# Patient Record
Sex: MALE | Race: WHITE | NOT HISPANIC OR LATINO | Employment: STUDENT | URBAN - METROPOLITAN AREA
[De-identification: names, ages, dates, MRNs, and addresses within clinical notes are randomized per-mention and may not be internally consistent; named-entity substitution may affect disease eponyms.]

---
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
## 2019-01-02 PROBLEM — B27.09 EBV HEPATITIS: Status: ACTIVE | Noted: 2019-01-02

## 2019-01-02 PROBLEM — E86.1 INTRAVASCULAR VOLUME DEPLETION: Status: ACTIVE | Noted: 2019-01-02

## 2019-01-02 PROBLEM — B17.8 EBV HEPATITIS: Status: ACTIVE | Noted: 2019-01-02

## 2019-01-04 PROBLEM — R16.1 SPLENOMEGALY: Status: ACTIVE | Noted: 2019-01-04

## 2019-01-04 PROBLEM — J03.90 EXUDATIVE TONSILLITIS: Status: ACTIVE | Noted: 2019-01-04

## 2019-01-04 PROBLEM — B27.99 INFECTIOUS MONONUCLEOSIS WITH HEPATITIS: Status: ACTIVE | Noted: 2019-01-04

## 2019-01-04 PROBLEM — B17.8 INFECTIOUS MONONUCLEOSIS WITH HEPATITIS: Status: ACTIVE | Noted: 2019-01-04

## 2019-01-04 PROBLEM — R10.9 ABDOMINAL PAIN: Status: ACTIVE | Noted: 2019-01-04

## 2019-01-11 ENCOUNTER — INFUSION (OUTPATIENT)
Dept: INFECTIOUS DISEASES | Facility: HOSPITAL | Age: 19
End: 2019-01-11
Attending: INTERNAL MEDICINE
Payer: COMMERCIAL

## 2019-01-11 ENCOUNTER — OFFICE VISIT (OUTPATIENT)
Dept: INFECTIOUS DISEASES | Facility: CLINIC | Age: 19
End: 2019-01-11
Payer: COMMERCIAL

## 2019-01-11 VITALS
RESPIRATION RATE: 18 BRPM | TEMPERATURE: 98 F | WEIGHT: 204.5 LBS | HEART RATE: 83 BPM | SYSTOLIC BLOOD PRESSURE: 145 MMHG | OXYGEN SATURATION: 100 % | DIASTOLIC BLOOD PRESSURE: 63 MMHG | BODY MASS INDEX: 27.73 KG/M2

## 2019-01-11 VITALS
WEIGHT: 205.25 LBS | SYSTOLIC BLOOD PRESSURE: 144 MMHG | BODY MASS INDEX: 27.8 KG/M2 | TEMPERATURE: 98 F | HEIGHT: 72 IN | DIASTOLIC BLOOD PRESSURE: 75 MMHG | HEART RATE: 96 BPM

## 2019-01-11 DIAGNOSIS — R16.1 SPLENOMEGALY: Primary | ICD-10-CM

## 2019-01-11 DIAGNOSIS — B17.8 INFECTIOUS MONONUCLEOSIS WITH HEPATITIS: ICD-10-CM

## 2019-01-11 DIAGNOSIS — B27.99 INFECTIOUS MONONUCLEOSIS WITH HEPATITIS: ICD-10-CM

## 2019-01-11 DIAGNOSIS — B17.8 EBV HEPATITIS: Primary | ICD-10-CM

## 2019-01-11 DIAGNOSIS — B27.09 EBV HEPATITIS: Primary | ICD-10-CM

## 2019-01-11 PROCEDURE — 96361 HYDRATE IV INFUSION ADD-ON: CPT

## 2019-01-11 PROCEDURE — 99245 PR OFFICE CONSULTATION,LEVEL V: ICD-10-PCS | Mod: S$GLB,,, | Performed by: INTERNAL MEDICINE

## 2019-01-11 PROCEDURE — 25000003 PHARM REV CODE 250: Performed by: INTERNAL MEDICINE

## 2019-01-11 PROCEDURE — 99245 OFF/OP CONSLTJ NEW/EST HI 55: CPT | Mod: S$GLB,,, | Performed by: INTERNAL MEDICINE

## 2019-01-11 PROCEDURE — 99999 PR PBB SHADOW E&M-EST. PATIENT-LVL III: CPT | Mod: PBBFAC,,, | Performed by: INTERNAL MEDICINE

## 2019-01-11 PROCEDURE — 99999 PR PBB SHADOW E&M-EST. PATIENT-LVL III: ICD-10-PCS | Mod: PBBFAC,,, | Performed by: INTERNAL MEDICINE

## 2019-01-11 PROCEDURE — 96360 HYDRATION IV INFUSION INIT: CPT

## 2019-01-11 RX ORDER — SODIUM CHLORIDE 0.9 % (FLUSH) 0.9 %
10 SYRINGE (ML) INJECTION
Status: CANCELLED | OUTPATIENT
Start: 2019-01-11

## 2019-01-11 RX ORDER — HEPARIN 100 UNIT/ML
500 SYRINGE INTRAVENOUS
Status: CANCELLED | OUTPATIENT
Start: 2019-01-11

## 2019-01-11 RX ADMIN — SODIUM CHLORIDE 2000 ML: 0.9 INJECTION, SOLUTION INTRAVENOUS at 10:01

## 2019-01-11 NOTE — PROGRESS NOTES
Subjective:      Patient ID: Gulshan Flores is a 18 y.o. male.    Chief Complaint:No chief complaint on file.      History of Present Illness    Seen today for another opinion in consultation from Dr. Campos. Accompanied by his mother today.  Patient started having symptoms of fever over Thanksgiving. Pushed through to complete school semester but worsened. Was hospitalized at [facility]. Diagnosed with mono. Also culture of throat positive for group C strep currently on clarithromycin.   Still with fever and alternating tylenol and ibuprofen q 3-4 hours.  Still with throat pain when he tries to eat. Drinks some water and pedialyte but really not eating much. Drinks a boost once a day. Has lost over 25 pounds.  Is supposed to start school Monday.      Review of Systems   Constitution: Positive for chills, decreased appetite, fever, weakness, malaise/fatigue, night sweats and weight loss. Negative for weight gain.   HENT: Positive for congestion, ear pain and sore throat. Negative for hearing loss, hoarse voice and tinnitus.    Eyes: Positive for redness. Negative for blurred vision and visual disturbance.   Cardiovascular: Positive for palpitations. Negative for chest pain and leg swelling.   Respiratory: Positive for cough, hemoptysis, shortness of breath, sputum production and wheezing.    Hematologic/Lymphatic: Positive for adenopathy. Does not bruise/bleed easily.   Skin: Negative for dry skin, itching, rash and suspicious lesions.   Musculoskeletal: Negative for back pain, joint pain, myalgias and neck pain.   Gastrointestinal: Positive for abdominal pain and diarrhea. Negative for constipation, heartburn, nausea and vomiting.   Genitourinary: Negative for dysuria, flank pain, frequency, hematuria, hesitancy and urgency.   Neurological: Positive for headaches. Negative for dizziness, numbness and paresthesias.   Psychiatric/Behavioral: Negative for depression and memory loss. The patient has insomnia. The  patient is not nervous/anxious.      Objective:   Physical Exam   Constitutional: He is oriented to person, place, and time. He appears well-developed and well-nourished. No distress.   Sweating during visit.   HENT:   Head: Normocephalic and atraumatic.   Right Ear: External ear normal.   Left Ear: External ear normal.   Nose: Nose normal.   Mouth/Throat: No oropharyngeal exudate.   No exudate but tonsils moderately enlarged and erythematous.    Eyes: Conjunctivae and EOM are normal. Pupils are equal, round, and reactive to light. Right eye exhibits no discharge. Left eye exhibits no discharge.   Neck: Normal range of motion. Neck supple. No JVD present. No tracheal deviation present. No thyromegaly present.   Cardiovascular: Normal rate, regular rhythm and normal heart sounds. Exam reveals no gallop and no friction rub.   No murmur heard.  Pulmonary/Chest: Effort normal and breath sounds normal. No stridor. No respiratory distress. He has no wheezes. He has no rales. He exhibits no tenderness.   Abdominal: Soft. Bowel sounds are normal. He exhibits no distension. There is no tenderness. There is no rebound and no guarding.   Spleen palpable.   Musculoskeletal: Normal range of motion. He exhibits no edema or tenderness.   Neurological: He is alert and oriented to person, place, and time. No cranial nerve deficit. Coordination normal.   Skin: Skin is warm. No rash noted. He is diaphoretic. No erythema. No pallor.   Psychiatric: He has a normal mood and affect. His behavior is normal. Judgment and thought content normal.   Nursing note and vitals reviewed.    Assessment:       1. EBV hepatitis    2. Infectious mononucleosis with hepatitis          Plan:       Diagnoses and all orders for this visit:    EBV hepatitis  -     CBC auto differential; Standing  -     Comprehensive metabolic panel; Standing    Infectious mononucleosis with hepatitis  -     CBC auto differential; Standing  -     Comprehensive metabolic  panel; Standing  -     Cancel: sodium chloride 0.9% bolus 2,000 mL    Other orders  -     sodium chloride 0.9% flush 10 mL  -     heparin, porcine (PF) 100 unit/mL injection flush 500 Units    Reviewed all labs and radiology results.  CMV AB is cross reactive, not due to actual CMV infection.  Symptoms c/w EBV mono and is improving.  Counsled at length about nutrition and hydration as he has lost over 25 pounds. Needs to drink 8 glasses of water, at least 3 boosts and soft foods- eggs, mashed potatoes, shakes, yogurt, soups, smoothies, etc.  Also discussed that patient will not be able to start school full time on Monday as he is still having fevers and fatigued. Also discussed typical waxing and waning course.  He is not playing sports and knows he should not.  Will give 2 L fluid today.  RTC 1 week with labs prior.

## 2019-01-12 NOTE — PATIENT INSTRUCTIONS
Mononucleosis (Mono)  Mononucleosis, also known as mono, is caused by the Rudolph-Barr virus. Mono is best known for causing swollen glands and tiredness, but it can cause other symptoms as well. Most children with mono recover without any problems. But the illness can take a long time to go away. In some cases, mono can cause problems with the liver, spleen, or heart. So it is important to diagnose mono and to watch your child carefully.  How mono is spread  Mono can be easily transmitted from an infected person's saliva by:  · Drinking and eating after them  · Sharing a straw, cup, toothbrushes, and eating utensils  · Kissing and close contact  · Handling toys with children drool  Symptoms of mono     The best treatment for mono is plenty of rest.   In children, common symptoms include:  · Tiredness, weakness  · Fever  · Sore throat  · Tender or swollen lymph nodes in the neck or armpits  · Swollen tonsils  · Rash  · Sore muscles or stiffness  · Headache  · Loss of appetite, nausea  · Dull pain in the stomach area  · Enlarged liver and spleen  · Headaches  · Puffy eyes  · Sensitivity to light  Treating mono  Because it is a viral infection, antibiotics wont cure mono. Your child's healthcare provider may prescribe medicines to help ease your child's pain or discomfort. The best treatment for mono is rest. A child with mono should also drink lots of fluids. To help your child feel better and recover sooner:  · Make sure your child gets enough rest.  · Provide plenty of fluids, such as water or apple juice.  · The spleen may become enlarged with mono. Your child may need to avoid contact sports, and heavy lifting for a while in order to prevent injury to the spleen. Discuss this with your child's healthcare provider.  · Treat fever, sore throat, headache, or aching muscles with acetaminophen or ibuprofen. Never give your child aspirin. Your child's healthcare provider or nurse can help you with the correct  dose.  Symptoms usually last for a few weeks, but can sometimes last for 1 to 2 months or longer. Even after symptoms go away, your child may be tired or weak for some time.  Preventing the spread of mono  While youre caring for a child with mono:  · Wash your hands with warm water and soap often, especially before and after tending to your sick child.  · Monitor your own health and that of other family members.  · Limit a sick childs contact with other children.  · Clean dishes and eating utensils used by a sick child separately in very hot, soapy water. Or run them through the .  When to seek medical care  Call your childs healthcare provider right away if your otherwise healthy child:  · Is younger than 3 months and has a fever of 100.4°F (38°C) or higher  · Is younger than 2 years old and has a fever that lasts more than 24 hours  · Is 2 years old or older and the fever continues for 3 days  · Has repeated fevers above 104°F (40°C) at any age  · Has had a seizure caused by the fever  · Experiences difficult or rapid breathing  · Cant be soothed or shows signs of irritability or restlessness  · Seems unusually drowsy, listless, or unresponsive  · Has trouble eating, drinking, or swallowing  · Stops breathing, even for an instant  · Shows signs of severe chest, neck, or belly pain  Date Last Reviewed: 12/1/2016  © 7194-1031 [employer]. [address] [place] [address], [place] [address]. All rights reserved. This information is not intended as a substitute for professional medical care. Always follow your healthcare professional's instructions.

## 2019-01-16 ENCOUNTER — PATIENT MESSAGE (OUTPATIENT)
Dept: INFECTIOUS DISEASES | Facility: CLINIC | Age: 19
End: 2019-01-16

## 2019-01-17 ENCOUNTER — LAB VISIT (OUTPATIENT)
Dept: LAB | Facility: HOSPITAL | Age: 19
End: 2019-01-17
Attending: INTERNAL MEDICINE
Payer: COMMERCIAL

## 2019-01-17 DIAGNOSIS — B17.8 INFECTIOUS MONONUCLEOSIS WITH HEPATITIS: ICD-10-CM

## 2019-01-17 DIAGNOSIS — B27.99 INFECTIOUS MONONUCLEOSIS WITH HEPATITIS: ICD-10-CM

## 2019-01-17 DIAGNOSIS — B17.8 EBV HEPATITIS: ICD-10-CM

## 2019-01-17 DIAGNOSIS — B27.09 EBV HEPATITIS: ICD-10-CM

## 2019-01-17 LAB
ALBUMIN SERPL BCP-MCNC: 3.7 G/DL
ALP SERPL-CCNC: 172 U/L
ALT SERPL W/O P-5'-P-CCNC: 66 U/L
ANION GAP SERPL CALC-SCNC: 6 MMOL/L
AST SERPL-CCNC: 28 U/L
BASOPHILS # BLD AUTO: 0.09 K/UL
BASOPHILS NFR BLD: 0.9 %
BILIRUB SERPL-MCNC: 0.7 MG/DL
BUN SERPL-MCNC: 17 MG/DL
CALCIUM SERPL-MCNC: 9.6 MG/DL
CHLORIDE SERPL-SCNC: 103 MMOL/L
CO2 SERPL-SCNC: 30 MMOL/L
CREAT SERPL-MCNC: 0.9 MG/DL
DIFFERENTIAL METHOD: ABNORMAL
EOSINOPHIL # BLD AUTO: 0.1 K/UL
EOSINOPHIL NFR BLD: 1 %
ERYTHROCYTE [DISTWIDTH] IN BLOOD BY AUTOMATED COUNT: 13.9 %
EST. GFR  (AFRICAN AMERICAN): >60 ML/MIN/1.73 M^2
EST. GFR  (NON AFRICAN AMERICAN): >60 ML/MIN/1.73 M^2
GLUCOSE SERPL-MCNC: 113 MG/DL
HCT VFR BLD AUTO: 43.1 %
HGB BLD-MCNC: 13.6 G/DL
IMM GRANULOCYTES # BLD AUTO: 0.04 K/UL
IMM GRANULOCYTES NFR BLD AUTO: 0.4 %
LYMPHOCYTES # BLD AUTO: 5.7 K/UL
LYMPHOCYTES NFR BLD: 54.7 %
MCH RBC QN AUTO: 27.9 PG
MCHC RBC AUTO-ENTMCNC: 31.6 G/DL
MCV RBC AUTO: 88 FL
MONOCYTES # BLD AUTO: 0.5 K/UL
MONOCYTES NFR BLD: 4.9 %
NEUTROPHILS # BLD AUTO: 4 K/UL
NEUTROPHILS NFR BLD: 38.1 %
NRBC BLD-RTO: 0 /100 WBC
PLATELET # BLD AUTO: 347 K/UL
PLATELET BLD QL SMEAR: ABNORMAL
PMV BLD AUTO: 8.9 FL
POTASSIUM SERPL-SCNC: 4.5 MMOL/L
PROT SERPL-MCNC: 8.2 G/DL
RBC # BLD AUTO: 4.88 M/UL
SODIUM SERPL-SCNC: 139 MMOL/L
WBC # BLD AUTO: 10.5 K/UL

## 2019-01-17 PROCEDURE — 85025 COMPLETE CBC W/AUTO DIFF WBC: CPT

## 2019-01-17 PROCEDURE — 36415 COLL VENOUS BLD VENIPUNCTURE: CPT

## 2019-01-17 PROCEDURE — 80053 COMPREHEN METABOLIC PANEL: CPT

## 2019-01-18 ENCOUNTER — OFFICE VISIT (OUTPATIENT)
Dept: INFECTIOUS DISEASES | Facility: CLINIC | Age: 19
End: 2019-01-18
Payer: COMMERCIAL

## 2019-01-18 VITALS
DIASTOLIC BLOOD PRESSURE: 70 MMHG | TEMPERATURE: 98 F | HEART RATE: 84 BPM | HEIGHT: 72 IN | SYSTOLIC BLOOD PRESSURE: 127 MMHG | BODY MASS INDEX: 26.88 KG/M2 | WEIGHT: 198.44 LBS

## 2019-01-18 DIAGNOSIS — B17.8 EBV HEPATITIS: Primary | ICD-10-CM

## 2019-01-18 DIAGNOSIS — B27.09 EBV HEPATITIS: Primary | ICD-10-CM

## 2019-01-18 PROCEDURE — 3008F PR BODY MASS INDEX (BMI) DOCUMENTED: ICD-10-PCS | Mod: CPTII,S$GLB,, | Performed by: INTERNAL MEDICINE

## 2019-01-18 PROCEDURE — 3008F BODY MASS INDEX DOCD: CPT | Mod: CPTII,S$GLB,, | Performed by: INTERNAL MEDICINE

## 2019-01-18 PROCEDURE — 99999 PR PBB SHADOW E&M-EST. PATIENT-LVL III: ICD-10-PCS | Mod: PBBFAC,,, | Performed by: INTERNAL MEDICINE

## 2019-01-18 PROCEDURE — 99215 OFFICE O/P EST HI 40 MIN: CPT | Mod: S$GLB,,, | Performed by: INTERNAL MEDICINE

## 2019-01-18 PROCEDURE — 99999 PR PBB SHADOW E&M-EST. PATIENT-LVL III: CPT | Mod: PBBFAC,,, | Performed by: INTERNAL MEDICINE

## 2019-01-18 PROCEDURE — 99215 PR OFFICE/OUTPT VISIT, EST, LEVL V, 40-54 MIN: ICD-10-PCS | Mod: S$GLB,,, | Performed by: INTERNAL MEDICINE

## 2019-01-18 NOTE — PROGRESS NOTES
Subjective:      Patient ID: Gulshan Flores is a 18 y.o. male.    Chief Complaint:No chief complaint on file.      History of Present Illness    Patient here today for follow up of EBV mono. Accompanied by his mother today.  Feeling much better. Has not yet gained weight but appetite much better. Is eating all foods- no throat pain. Some night sweats. No fever. Stopped ibuprofen and tylenol.   Still sleeping more than normal. Did start school in . Spoke with his teachers and they are willing to work with him if he has to miss or delay class.  Finished biaxin.  C/o cough with occasional green sputum. No SOB or CP.  No diarrhea.      From my initial consult 1/11/19:  Seen today for another opinion in consultation from Dr. Campos. Accompanied by his mother today.  Patient started having symptoms of fever over Thanksgiving. Pushed through to complete school semester but worsened. Was hospitalized at [facility]. Diagnosed with mono. Also culture of throat positive for group C strep currently on clarithromycin.   Still with fever and alternating tylenol and ibuprofen q 3-4 hours.  Still with throat pain when he tries to eat. Drinks some water and pedialyte but really not eating much. Drinks a boost once a day. Has lost over 25 pounds.  Is supposed to start school Monday.      Review of Systems   Constitution: Positive for malaise/fatigue and night sweats. Negative for chills, decreased appetite, fever, weakness, weight gain and weight loss.   HENT: Negative for congestion, ear pain, hearing loss, hoarse voice, sore throat and tinnitus.    Eyes: Negative for blurred vision, redness and visual disturbance.   Cardiovascular: Negative for chest pain, leg swelling and palpitations.   Respiratory: Positive for cough. Negative for hemoptysis, shortness of breath and sputum production.    Hematologic/Lymphatic: Negative for adenopathy. Does not bruise/bleed easily.   Skin: Negative for dry skin, itching, rash and suspicious  lesions.   Musculoskeletal: Negative for back pain, joint pain, myalgias and neck pain.   Gastrointestinal: Negative for abdominal pain, constipation, diarrhea, heartburn, nausea and vomiting.   Genitourinary: Negative for dysuria, flank pain, frequency, hematuria, hesitancy and urgency.   Neurological: Negative for dizziness, headaches, numbness and paresthesias.   Psychiatric/Behavioral: Negative for depression and memory loss. The patient does not have insomnia and is not nervous/anxious.      Objective:   Physical Exam   Constitutional: He is oriented to person, place, and time. He appears well-developed and well-nourished. No distress.   HENT:   Head: Normocephalic and atraumatic.   Right Ear: External ear normal.   Left Ear: External ear normal.   Nose: Nose normal.   Mouth/Throat: No oropharyngeal exudate.   No exudate and tonsil swelling improved.   Eyes: Conjunctivae and EOM are normal. Pupils are equal, round, and reactive to light. Right eye exhibits no discharge. Left eye exhibits no discharge.   Neck: Normal range of motion. Neck supple. No JVD present. No tracheal deviation present. No thyromegaly present.   Right submandibular LAD.   Cardiovascular: Normal rate, regular rhythm and normal heart sounds. Exam reveals no gallop and no friction rub.   No murmur heard.  Pulmonary/Chest: Effort normal and breath sounds normal. No stridor. No respiratory distress. He has no wheezes. He has no rales. He exhibits no tenderness.   Abdominal: Bowel sounds are normal. He exhibits no distension.   Musculoskeletal: Normal range of motion. He exhibits no edema or tenderness.   Neurological: He is alert and oriented to person, place, and time. No cranial nerve deficit. Coordination normal.   Skin: Skin is warm. No rash noted. He is not diaphoretic. No erythema. No pallor.   Psychiatric: He has a normal mood and affect. His behavior is normal. Judgment and thought content normal.   Nursing note and vitals  reviewed.    Assessment:       1. EBV hepatitis          Plan:       Diagnoses and all orders for this visit:    EBV hepatitis    Reviewed all labs and radiology results. LFTs much better.  CMV AB is cross reactive, not due to actual CMV infection.  Symptoms c/w EBV mono and is improving.  Counsled at length about nutrition and hydration and appetite back so suspect he will begin to gain weight.  Discussed typical waxing and waning course of mono.  He is not playing sports and knows he should not.  No additional antibiotics for now.  Answered all questions.  RTC 1 month with labs prior.

## 2021-05-10 ENCOUNTER — PATIENT MESSAGE (OUTPATIENT)
Dept: RESEARCH | Facility: HOSPITAL | Age: 21
End: 2021-05-10